# Patient Record
Sex: MALE | Race: WHITE | URBAN - METROPOLITAN AREA
[De-identification: names, ages, dates, MRNs, and addresses within clinical notes are randomized per-mention and may not be internally consistent; named-entity substitution may affect disease eponyms.]

---

## 2022-07-12 ENCOUNTER — OFFICE VISIT (OUTPATIENT)
Dept: URBAN - METROPOLITAN AREA CLINIC 89 | Facility: CLINIC | Age: 22
End: 2022-07-12
Payer: COMMERCIAL

## 2022-07-12 DIAGNOSIS — H50.9 STRABISMUS: Primary | ICD-10-CM

## 2022-07-12 DIAGNOSIS — H52.13 MYOPIA, BILATERAL: ICD-10-CM

## 2022-07-12 PROCEDURE — 92004 COMPRE OPH EXAM NEW PT 1/>: CPT | Performed by: OPTOMETRIST

## 2022-07-12 ASSESSMENT — INTRAOCULAR PRESSURE
OS: 13
OD: 13

## 2022-07-12 NOTE — IMPRESSION/PLAN
Impression: Strabismus: H50.9. Plan: Strabismus by history. Given unilateral myopia and orthophoria, we will Rx glasses only at this time. Will defer treatment with prism at this time; patient will RTC if alignment issues continue.   Otherwise RTC 1 year